# Patient Record
Sex: FEMALE | Race: BLACK OR AFRICAN AMERICAN | NOT HISPANIC OR LATINO
[De-identification: names, ages, dates, MRNs, and addresses within clinical notes are randomized per-mention and may not be internally consistent; named-entity substitution may affect disease eponyms.]

---

## 2021-10-28 ENCOUNTER — APPOINTMENT (OUTPATIENT)
Dept: GYNECOLOGIC ONCOLOGY | Facility: CLINIC | Age: 76
End: 2021-10-28
Payer: MEDICARE

## 2021-10-28 VITALS
WEIGHT: 235 LBS | HEART RATE: 124 BPM | SYSTOLIC BLOOD PRESSURE: 166 MMHG | DIASTOLIC BLOOD PRESSURE: 87 MMHG | RESPIRATION RATE: 16 BRPM | HEIGHT: 67 IN | BODY MASS INDEX: 36.88 KG/M2 | OXYGEN SATURATION: 98 %

## 2021-10-28 DIAGNOSIS — Z87.891 PERSONAL HISTORY OF NICOTINE DEPENDENCE: ICD-10-CM

## 2021-10-28 DIAGNOSIS — Z80.1 FAMILY HISTORY OF MALIGNANT NEOPLASM OF TRACHEA, BRONCHUS AND LUNG: ICD-10-CM

## 2021-10-28 DIAGNOSIS — Z86.79 PERSONAL HISTORY OF OTHER DISEASES OF THE CIRCULATORY SYSTEM: ICD-10-CM

## 2021-10-28 DIAGNOSIS — I10 ESSENTIAL (PRIMARY) HYPERTENSION: ICD-10-CM

## 2021-10-28 DIAGNOSIS — E78.5 HYPERLIPIDEMIA, UNSPECIFIED: ICD-10-CM

## 2021-10-28 PROCEDURE — 99204 OFFICE O/P NEW MOD 45 MIN: CPT | Mod: 25

## 2021-10-28 PROCEDURE — 76857 US EXAM PELVIC LIMITED: CPT | Mod: 59

## 2021-10-28 PROCEDURE — 76830 TRANSVAGINAL US NON-OB: CPT | Mod: 59

## 2021-10-28 RX ORDER — ASPIRIN 81 MG
81 TABLET, DELAYED RELEASE (ENTERIC COATED) ORAL
Refills: 0 | Status: ACTIVE | COMMUNITY

## 2021-10-28 RX ORDER — MULTIVITAMIN
TABLET ORAL
Refills: 0 | Status: ACTIVE | COMMUNITY

## 2021-10-28 RX ORDER — RIVAROXABAN 2.5 MG/1
TABLET, FILM COATED ORAL
Refills: 0 | Status: ACTIVE | COMMUNITY

## 2021-10-28 RX ORDER — ENALAPRIL MALEATE 5 MG/1
TABLET ORAL
Refills: 0 | Status: ACTIVE | COMMUNITY

## 2021-10-28 RX ORDER — CALCIUM CARBONATE 600 MG
TABLET ORAL
Refills: 0 | Status: ACTIVE | COMMUNITY

## 2021-10-28 RX ORDER — ASCORBIC ACID 500 MG
TABLET ORAL
Refills: 0 | Status: ACTIVE | COMMUNITY

## 2021-10-28 RX ORDER — LEVOTHYROXINE SODIUM 0.1 MG/1
100 TABLET ORAL
Refills: 0 | Status: ACTIVE | COMMUNITY

## 2021-11-03 NOTE — PHYSICAL EXAM
[Absent] : Uterus: Absent [Normal] : Recto-Vaginal Exam: Normal [de-identified] : Patient was interviewed and examined with chaperone present. Name of Chaperone: Sweta Hernandez PA-C

## 2021-11-03 NOTE — PROCEDURE
[FreeTextEntry1] : A US was performed in the office today. Please see report for findings. Uterus absent. RTO with complex/heterogeneous mass seen 2.5 x 2.6 x 2.5 cm.

## 2021-11-03 NOTE — END OF VISIT
[FreeTextEntry3] : This note accurately reflects the work and decisions made by me.\par Written by Sweta Hernandez PA-C acting as a scribe for Dr. Hayden Sena.

## 2021-11-03 NOTE — HISTORY OF PRESENT ILLNESS
[FreeTextEntry1] : 77 y/o  via  female, LMP at age 39 s/p vaginal hysterectomy due to heavy vaginal bleeding and fibroids being referred by Dr. Cook. Patient was recently at [facility] for SOB where she was found to have a PE and right ovarian fibroma, and treated for PNA and UTI. CTA chest with bilateral acute PE, no right heart strain. Trace right pleural effusion, Minimal reticular opacities in the Right lung base with groundglass opacities concerning for infectious/inflammatory process with atelectatic changes. Left adrenal nodularity measuring 1.7 cm indeterminant and incompletely imaged. MR Pelvis was performed which revealed right ovarian fibroma measuring 2.2 cm. Patient reports she was told likely PE came secondary to PNA, no evidence of disease on echo as per patient and LE dopplers negative. Patient reports occasional right sided pelvic pain. Denies vaginal bleeding/discharge, abdominal pain/bloating/distension, pelvis discomfort, changes in normal bowel/urinary habits, nausea/vomiting, unintentional weight loss/gain, and all other associated signs and symptoms. \par \par 2018 US revewied: S/P hysterectomy, unremarkable ovaries. \par \par Health Screening:\par Last Pap: 37 years ago \par Last Mammogram: 2020 or 2019; normal as per patient \par Last Colonoscopy: 2021, normal as per patient \par Last Dexa: ; normal as per patient

## 2021-11-03 NOTE — ASSESSMENT
[FreeTextEntry1] : 75 y/o with suspicious right ovarian fibroma. I discussed with the patient that US imaging is not concerning at this time for a urgent operation. I discussed given her recent history and treatment of pneumonia and PE, I would recommend taking care of that at this time and I do not believe her PE is related to ovarian mass. I discussed that it appears this is a benign growth of her ovary. I discussed that we would not know for certain unless we went in surgically but at this time I am recommending a PETROS for reassurance and am inclined to observe at this time and not operate. When compared to US imaging in 2018, ovarian fibroma was not noted.

## 2022-01-12 ENCOUNTER — FORM ENCOUNTER (OUTPATIENT)
Age: 77
End: 2022-01-12

## 2022-01-13 ENCOUNTER — APPOINTMENT (OUTPATIENT)
Dept: GYNECOLOGIC ONCOLOGY | Facility: CLINIC | Age: 77
End: 2022-01-13
Payer: MEDICARE

## 2022-01-13 PROCEDURE — 99213 OFFICE O/P EST LOW 20 MIN: CPT | Mod: 25

## 2022-01-13 PROCEDURE — 76830 TRANSVAGINAL US NON-OB: CPT | Mod: 59

## 2022-01-13 PROCEDURE — 76857 US EXAM PELVIC LIMITED: CPT | Mod: 59

## 2022-01-13 NOTE — PHYSICAL EXAM
[Normal] : Mood and affect: Normal [FreeTextEntry1] : Arabella Salazar Medical assistant chaperoned during results and discussion

## 2022-01-13 NOTE — ASSESSMENT
[FreeTextEntry1] : Ultrasound performed in office today revealed uterus surgically absent.\par right ovary abnormal 2.8 x 1.4 x 1.5 cm. findings: 2.6 x 2.2 x 2.4 cm grossly stable right ovarian mass.\par left ovary not seen\par free fluid in pelvis absent\par \par Discussed with patient ultrasound was stable today. Recommended patient return in July for a follow up ultrasound. Patient stated she understood and agreed to comply.

## 2022-01-13 NOTE — END OF VISIT
[FreeTextEntry3] : Written by Arabella Salazar, acting as a scribe for Dr. Hayden Sena \par This note accurately reflects the work and decisions made by me.

## 2022-01-13 NOTE — HISTORY OF PRESENT ILLNESS
[FreeTextEntry1] : 77 y/o, LMP at age 39 s/p vaginal hysterectomy due to heavy VB and fibroids was referred by Dr. Cook. Patient had presented to [facility] for SOB where she was found to have a PE and right ovarian fibroma, and treated for PNA and UTI. CTA chest with bilateral acute PE, no right heart strain. Trace right pleural effusion, Minimal reticular opacities in the Right lung base with groundglass opacities concerning for infectious/inflammatory process with atelectatic changes. Left adrenal nodularity measuring 1.7 cm indeterminant and incompletely imaged. MR Pelvis was performed which revealed right ovarian fibroma measuring 2.2 cm. Patient reported she was told likely PE came secondary to PNA, no evidence of disease on echo as per patient and LE dopplers negative. Patient reported occasional right sided pelvic pain. Denied VB/discharge, abdominal pain/bloating/distension, pelvis discomfort, changes in normal bowel/urinary habits, nausea/vomiting, unintentional weight loss/gain, and all other associated signs and symptoms. \par \par Ultrasound performed in my office in October 2021 revealed Uterus absent. RTO with complex/heterogeneous mass seen 2.5 x 2.6 x 2.5 cm. She returns to the office today for a follow up ultrasound. \par

## 2022-01-13 NOTE — REASON FOR VISIT
[FreeTextEntry1] : [place] Location \par \par [facility] [place]. [phone]\par [address] [place] [address] \par \par \par -Referred 10/2021 for ovarian fibroma. \par -S/p vaginal hysterectomy at 40 y/o. \par -PETROS 10/28/21 Nu411=1 HE4= 74 PRE=1.67(h) Post= 1.24\par -F/u ultrasound.

## 2022-07-06 ENCOUNTER — APPOINTMENT (OUTPATIENT)
Dept: GYNECOLOGIC ONCOLOGY | Facility: CLINIC | Age: 77
End: 2022-07-06

## 2022-07-06 VITALS
HEART RATE: 75 BPM | RESPIRATION RATE: 16 BRPM | HEIGHT: 67 IN | OXYGEN SATURATION: 98 % | SYSTOLIC BLOOD PRESSURE: 134 MMHG | BODY MASS INDEX: 37.04 KG/M2 | DIASTOLIC BLOOD PRESSURE: 76 MMHG | WEIGHT: 236 LBS

## 2022-07-06 PROCEDURE — 76857 US EXAM PELVIC LIMITED: CPT | Mod: 59

## 2022-07-06 PROCEDURE — 76830 TRANSVAGINAL US NON-OB: CPT | Mod: 59

## 2022-07-06 PROCEDURE — 99212 OFFICE O/P EST SF 10 MIN: CPT | Mod: 25

## 2022-07-06 RX ORDER — LEVOTHYROXINE SODIUM 0.11 MG/1
112 TABLET ORAL
Qty: 45 | Refills: 0 | Status: ACTIVE | COMMUNITY
Start: 2022-06-22

## 2022-07-06 RX ORDER — METOPROLOL SUCCINATE 50 MG/1
50 TABLET, EXTENDED RELEASE ORAL
Qty: 90 | Refills: 0 | Status: ACTIVE | COMMUNITY
Start: 2021-12-22

## 2022-07-06 RX ORDER — ENALAPRIL MALEATE 2.5 MG/1
2.5 TABLET ORAL
Qty: 90 | Refills: 0 | Status: ACTIVE | COMMUNITY
Start: 2021-12-22

## 2022-07-06 RX ORDER — ROSUVASTATIN CALCIUM 20 MG/1
20 TABLET, FILM COATED ORAL
Qty: 90 | Refills: 0 | Status: ACTIVE | COMMUNITY
Start: 2022-06-21

## 2022-07-06 NOTE — HISTORY OF PRESENT ILLNESS
[FreeTextEntry1] : Pt is a 78 yo s/p vaginal hysterectomy due to heavy bleeding at age 39 now with a right ovarian fibroma. She had Ultrasound in october 2021 showing a 2.5 x 2.6 x 2.5 cm ovarian fibroma and it was stable 1/22 on office US and presents for follow up US today. She reports no new concerns. She also had Mammogram ordered last visit.

## 2022-07-06 NOTE — PHYSICAL EXAM
[Chaperone Present] : A chaperone was present in the examining room during all aspects of the physical examination [FreeTextEntry1] : Dayana Bridges MA was present the entire duration of the patient interaction and gynecological exam. [Normal] : Masses/Tenderness: Non-tender, no masses [Capable of only limited self care, confined to bed or chair more than 50% of waking hours] : Status 3- Capable of only limited self care, confined to bed or chair more than 50% of waking hours

## 2022-07-06 NOTE — ASSESSMENT
[FreeTextEntry1] : Pt is a 76 yo with a right ovarian fibroma stable on todays US since at least 10/21. Recommend follow up every 6 months for the first 2 years and then yearly. The patient will see Dr. Castro for continued surveillance and can return with any evidence of increase or change. Mammogram reviewed which is BIRADs1 and she needs discussion regarding continued mammograms in the future.

## 2022-08-22 ENCOUNTER — FORM ENCOUNTER (OUTPATIENT)
Age: 77
End: 2022-08-22

## 2022-11-22 ENCOUNTER — OFFICE (OUTPATIENT)
Dept: URBAN - METROPOLITAN AREA CLINIC 12 | Facility: CLINIC | Age: 77
Setting detail: OPHTHALMOLOGY
End: 2022-11-22
Payer: MEDICARE

## 2022-11-22 DIAGNOSIS — H90.3: ICD-10-CM

## 2022-11-22 PROCEDURE — 92567 TYMPANOMETRY: CPT

## 2022-11-22 PROCEDURE — 92557 COMPREHENSIVE HEARING TEST: CPT

## 2022-12-08 ENCOUNTER — OFFICE (OUTPATIENT)
Dept: URBAN - METROPOLITAN AREA CLINIC 12 | Facility: CLINIC | Age: 77
Setting detail: OPHTHALMOLOGY
End: 2022-12-08

## 2022-12-08 DIAGNOSIS — H90.3: ICD-10-CM

## 2022-12-08 PROCEDURE — 92593 HEARING AID CHECK; BINAURAL: CPT

## 2023-01-09 ENCOUNTER — APPOINTMENT (OUTPATIENT)
Dept: OBGYN | Facility: CLINIC | Age: 78
End: 2023-01-09
Payer: MEDICARE

## 2023-01-09 VITALS
SYSTOLIC BLOOD PRESSURE: 134 MMHG | BODY MASS INDEX: 35.83 KG/M2 | HEIGHT: 67.5 IN | WEIGHT: 231 LBS | DIASTOLIC BLOOD PRESSURE: 76 MMHG

## 2023-01-09 DIAGNOSIS — Z12.72 ENCOUNTER FOR SCREENING FOR MALIGNANT NEOPLASM OF VAGINA: ICD-10-CM

## 2023-01-09 DIAGNOSIS — Z86.711 PERSONAL HISTORY OF PULMONARY EMBOLISM: ICD-10-CM

## 2023-01-09 DIAGNOSIS — Z85.850 PERSONAL HISTORY OF MALIGNANT NEOPLASM OF THYROID: ICD-10-CM

## 2023-01-09 DIAGNOSIS — Z00.00 ENCOUNTER FOR GENERAL ADULT MEDICAL EXAMINATION W/OUT ABNORMAL FINDINGS: ICD-10-CM

## 2023-01-09 DIAGNOSIS — Z82.49 FAMILY HISTORY OF ISCHEMIC HEART DISEASE AND OTHER DISEASES OF THE CIRCULATORY SYSTEM: ICD-10-CM

## 2023-01-09 DIAGNOSIS — Z83.3 FAMILY HISTORY OF DIABETES MELLITUS: ICD-10-CM

## 2023-01-09 LAB
BILIRUB UR QL STRIP: NORMAL
CLARITY UR: CLEAR
COLLECTION METHOD: NORMAL
GLUCOSE UR-MCNC: NORMAL
HCG UR QL: 0.2 EU/DL
HGB UR QL STRIP.AUTO: NORMAL
KETONES UR-MCNC: NORMAL
LEUKOCYTE ESTERASE UR QL STRIP: NORMAL
NITRITE UR QL STRIP: NORMAL
PH UR STRIP: 5
PROT UR STRIP-MCNC: NORMAL
SP GR UR STRIP: 1.01

## 2023-01-09 PROCEDURE — 81003 URINALYSIS AUTO W/O SCOPE: CPT | Mod: QW

## 2023-01-09 PROCEDURE — 99203 OFFICE O/P NEW LOW 30 MIN: CPT

## 2023-01-09 NOTE — DISCUSSION/SUMMARY
[FreeTextEntry1] : I reviewed the patient's medical history with her.\par Exam as above\par Stable ovarian fibroma\par Discussed follow up with US, referral given. \par Vaginal PAP done\par Follow up with results.

## 2023-01-09 NOTE — PHYSICAL EXAM
[Chaperone Present] : A chaperone was present in the examining room during all aspects of the physical examination [FreeTextEntry1] : Gina eVrma [Appropriately responsive] : appropriately responsive [Alert] : alert [No Acute Distress] : no acute distress [Soft] : soft [Non-tender] : non-tender [Non-distended] : non-distended [Oriented x3] : oriented x3 [Labia Majora] : normal [Labia Minora] : normal [Normal] : normal [Absent] : absent [Uterine Adnexae] : normal

## 2023-01-09 NOTE — REASON FOR VISIT
[Initial] : an initial consultation for [FreeTextEntry2] : ovarian mass  [FreeTextEntry1] : Dr Mckay Valdez

## 2023-01-09 NOTE — HISTORY OF PRESENT ILLNESS
[Patient reported mammogram was normal] : Patient reported mammogram was normal [Patient reported bone density results were normal] : Patient reported bone density results were normal [Patient reported colonoscopy was normal] : Patient reported colonoscopy was normal [postmenopausal] : postmenopausal [N] : Patient is not sexually active [Y] : Positive pregnancy history [FreeTextEntry1] : Melany is a 77-year-old  referred for continued surveillance of a fibroma by Dr. Valdez.\par She was initially seen in the [facility] office in 2021, after being diagnosed with a 2.2 cm ovarian fibroma on pelvic MRI.  She had a sonogram on that visit that showed a 2.6 cm heterogeneous mass.  Follow-up in January showed a stable right ovarian finding.  A repeat sono in 2022 showed a stable 2.6 cm heterogeneous right adnexal mass.  Of note, the patient had a hysterectomy at age 39.\par LMP  Age 39, hysterectomy for fibroids.\par FMP age 12 \par Last PAP age 39 – no history of abnormal Pap.\par Sexually not active\par No history of STDs.\par  -0-2-1, SAB x2,  x1\par Mammogram -  normal, 2022.\par Colonoscopy -  normal, 2021, normal\par DEXA -  normal, not within the last 2 years. \par PMH -  HTN, thyroid cancer, , hypoglycemia, PE.\par PSH - total thyroidectomy.\par Medications - enalapril, Lopressor, levothyroxine, aspirin, xarelto, caltrate\par Allergies - tylenol, cashew nuts, Vicodin, codeine, antacid. \par No smoking or drug use, ETOH - none. \par Family - DM, heart disease, arrhythmias, HTN, MI, CVA, brother - lung cancer.\par  [PGHxTotal] : 2 [[facility]] : 1 [PGHxABSpont] : 1

## 2023-01-10 LAB — HPV HIGH+LOW RISK DNA PNL CVX: NOT DETECTED

## 2023-01-17 ENCOUNTER — NON-APPOINTMENT (OUTPATIENT)
Age: 78
End: 2023-01-17

## 2023-01-17 LAB — CYTOLOGY CVX/VAG DOC THIN PREP: NORMAL

## 2023-01-24 ENCOUNTER — OFFICE (OUTPATIENT)
Dept: URBAN - METROPOLITAN AREA CLINIC 6 | Facility: CLINIC | Age: 78
Setting detail: OPHTHALMOLOGY
End: 2023-01-24
Payer: MEDICARE

## 2023-01-24 DIAGNOSIS — H43.393: ICD-10-CM

## 2023-01-24 DIAGNOSIS — H01.001: ICD-10-CM

## 2023-01-24 DIAGNOSIS — H02.831: ICD-10-CM

## 2023-01-24 DIAGNOSIS — Z96.1: ICD-10-CM

## 2023-01-24 DIAGNOSIS — H02.833: ICD-10-CM

## 2023-01-24 DIAGNOSIS — H01.002: ICD-10-CM

## 2023-01-24 DIAGNOSIS — H40.013: ICD-10-CM

## 2023-01-24 DIAGNOSIS — H01.004: ICD-10-CM

## 2023-01-24 DIAGNOSIS — H02.834: ICD-10-CM

## 2023-01-24 DIAGNOSIS — H01.005: ICD-10-CM

## 2023-01-24 DIAGNOSIS — E11.9: ICD-10-CM

## 2023-01-24 DIAGNOSIS — H11.153: ICD-10-CM

## 2023-01-24 PROCEDURE — 92014 COMPRE OPH EXAM EST PT 1/>: CPT | Performed by: OPHTHALMOLOGY

## 2023-01-24 PROCEDURE — 92250 FUNDUS PHOTOGRAPHY W/I&R: CPT | Performed by: OPHTHALMOLOGY

## 2023-01-24 ASSESSMENT — KERATOMETRY
OD_K1POWER_DIOPTERS: 44.75
OS_K2POWER_DIOPTERS: 45.00
METHOD_AUTO_MANUAL: AUTO
OS_K1POWER_DIOPTERS: 44.50
OD_AXISANGLE_DEGREES: 71
OS_AXISANGLE_DEGREES: 103
OD_K2POWER_DIOPTERS: 45.50

## 2023-01-24 ASSESSMENT — REFRACTION_AUTOREFRACTION
OS_CYLINDER: -0.25
OD_CYLINDER: -0.75
OD_SPHERE: -1.75
OS_AXIS: 95
OS_SPHERE: -2.25
OD_AXIS: 140

## 2023-01-24 ASSESSMENT — REFRACTION_MANIFEST
OS_AXIS: 020
OS_VA1: 20/30-
OS_CYLINDER: -0.75
OS_SPHERE: -2.25
OS_VA1: 20/20
OD_AXIS: 145
OD_CYLINDER: -0.25
OD_VA1: 20/25+2
OS_AXIS: 69
OD_SPHERE: -1.75
OS_CYLINDER: -0.25
OS_SPHERE: -1.00
OD_AXIS: 123
OU_VA: 20/20
OD_CYLINDER: -0.75
OD_VA1: 20/20
OD_SPHERE: -0.50

## 2023-01-24 ASSESSMENT — REFRACTION_CURRENTRX
OD_CYLINDER: -0.75
OD_VPRISM_DIRECTION: SV
OD_OVR_VA: 20/
OD_AXIS: 143
OS_VPRISM_DIRECTION: SV
OS_AXIS: 11
OS_SPHERE: -2.25
OS_OVR_VA: 20/
OD_SPHERE: -1.75
OS_CYLINDER: -0.25

## 2023-01-24 ASSESSMENT — VISUAL ACUITY
OD_BCVA: 20/20
OS_BCVA: 20/20-2

## 2023-01-24 ASSESSMENT — PACHYMETRY
OD_CT_CORRECTION: 4
OS_CT_UM: 471
OD_CT_UM: 480
OS_CT_CORRECTION: 5

## 2023-01-24 ASSESSMENT — SPHEQUIV_DERIVED
OD_SPHEQUIV: -2.125
OD_SPHEQUIV: -0.625
OS_SPHEQUIV: -1.375
OS_SPHEQUIV: -2.375
OD_SPHEQUIV: -2.125
OS_SPHEQUIV: -2.375

## 2023-01-24 ASSESSMENT — AXIALLENGTH_DERIVED
OS_AL: 24.0675
OD_AL: 23.8252
OS_AL: 23.6687
OD_AL: 23.8252
OS_AL: 24.0675
OD_AL: 23.2437

## 2023-01-24 ASSESSMENT — LID EXAM ASSESSMENTS
OD_BLEPHARITIS: RUL 1+
OS_BLEPHARITIS: LUL 1+

## 2023-01-24 ASSESSMENT — CONFRONTATIONAL VISUAL FIELD TEST (CVF)
OS_FINDINGS: FULL
OD_FINDINGS: FULL

## 2023-01-24 ASSESSMENT — TONOMETRY
OD_IOP_MMHG: 16
OS_IOP_MMHG: 14

## 2023-01-25 ENCOUNTER — NON-APPOINTMENT (OUTPATIENT)
Age: 78
End: 2023-01-25

## 2023-01-30 ENCOUNTER — NON-APPOINTMENT (OUTPATIENT)
Age: 78
End: 2023-01-30

## 2023-02-13 ENCOUNTER — OFFICE (OUTPATIENT)
Dept: URBAN - METROPOLITAN AREA CLINIC 12 | Facility: CLINIC | Age: 78
Setting detail: OPHTHALMOLOGY
End: 2023-02-13
Payer: MEDICARE

## 2023-02-13 DIAGNOSIS — H11.31: ICD-10-CM

## 2023-02-13 PROCEDURE — 92012 INTRM OPH EXAM EST PATIENT: CPT | Performed by: OPTOMETRIST

## 2023-02-13 ASSESSMENT — REFRACTION_CURRENTRX
OS_CYLINDER: -0.25
OS_SPHERE: -2.25
OD_CYLINDER: -0.75
OS_OVR_VA: 20/
OD_SPHERE: -1.75
OS_AXIS: 11
OD_OVR_VA: 20/
OD_AXIS: 143
OD_VPRISM_DIRECTION: SV
OS_VPRISM_DIRECTION: SV

## 2023-02-13 ASSESSMENT — CONFRONTATIONAL VISUAL FIELD TEST (CVF)
OD_FINDINGS: FULL
OS_FINDINGS: FULL

## 2023-02-13 ASSESSMENT — REFRACTION_AUTOREFRACTION
OS_SPHERE: -2.25
OS_CYLINDER: -0.25
OD_CYLINDER: -0.75
OS_AXIS: 172
OD_AXIS: 147
OD_SPHERE: -1.75

## 2023-02-13 ASSESSMENT — SPHEQUIV_DERIVED
OD_SPHEQUIV: -0.625
OD_SPHEQUIV: -2.125
OS_SPHEQUIV: -2.375
OS_SPHEQUIV: -2.375
OD_SPHEQUIV: -2.125
OS_SPHEQUIV: -1.375

## 2023-02-13 ASSESSMENT — REFRACTION_MANIFEST
OD_VA1: 20/20
OS_SPHERE: -1.00
OD_CYLINDER: -0.25
OS_CYLINDER: -0.75
OS_AXIS: 020
OS_AXIS: 69
OD_AXIS: 145
OS_VA1: 20/20
OS_SPHERE: -2.25
OD_SPHERE: -1.75
OD_CYLINDER: -0.75
OU_VA: 20/20
OD_SPHERE: -0.50
OS_VA1: 20/30-
OD_VA1: 20/25+2
OD_AXIS: 123
OS_CYLINDER: -0.25

## 2023-02-13 ASSESSMENT — PACHYMETRY
OS_CT_UM: 471
OD_CT_UM: 480
OS_CT_CORRECTION: 5
OD_CT_CORRECTION: 4

## 2023-02-13 ASSESSMENT — KERATOMETRY
OD_K2POWER_DIOPTERS: 45.75
OS_K1POWER_DIOPTERS: 44.75
OS_AXISANGLE_DEGREES: 104
METHOD_AUTO_MANUAL: AUTO
OD_AXISANGLE_DEGREES: 072
OD_K1POWER_DIOPTERS: 44.50
OS_K2POWER_DIOPTERS: 45.25

## 2023-02-13 ASSESSMENT — VISUAL ACUITY
OS_BCVA: 20/25-1
OD_BCVA: 20/25-1

## 2023-02-13 ASSESSMENT — LID EXAM ASSESSMENTS
OD_BLEPHARITIS: RUL 1+
OS_BLEPHARITIS: LUL 1+

## 2023-02-13 ASSESSMENT — AXIALLENGTH_DERIVED
OS_AL: 23.9723
OD_AL: 23.2437
OS_AL: 23.5767
OS_AL: 23.9723
OD_AL: 23.8252
OD_AL: 23.8252

## 2023-02-13 ASSESSMENT — TONOMETRY
OS_IOP_MMHG: 16
OD_IOP_MMHG: 17

## 2023-03-08 ENCOUNTER — OFFICE (OUTPATIENT)
Dept: URBAN - METROPOLITAN AREA CLINIC 12 | Facility: CLINIC | Age: 78
Setting detail: OPHTHALMOLOGY
End: 2023-03-08

## 2023-03-08 DIAGNOSIS — H90.3: ICD-10-CM

## 2023-03-08 PROCEDURE — 92593 HEARING AID CHECK; BINAURAL: CPT

## 2023-05-24 ENCOUNTER — TELEHEALTH (OUTPATIENT)
Dept: URBAN - METROPOLITAN AREA CLINIC 71 | Facility: CLINIC | Age: 78
Setting detail: OPHTHALMOLOGY
End: 2023-05-24
Payer: MEDICARE

## 2023-05-24 DIAGNOSIS — H11.31: ICD-10-CM

## 2023-05-24 PROCEDURE — 92012 INTRM OPH EXAM EST PATIENT: CPT | Performed by: STUDENT IN AN ORGANIZED HEALTH CARE EDUCATION/TRAINING PROGRAM

## 2023-05-24 ASSESSMENT — TONOMETRY
OD_IOP_MMHG: 16
OS_IOP_MMHG: 13

## 2023-05-24 ASSESSMENT — KERATOMETRY
OD_K1POWER_DIOPTERS: 44.50
OD_AXISANGLE_DEGREES: 067
OD_K2POWER_DIOPTERS: 45.75
METHOD_AUTO_MANUAL: AUTO
OS_K2POWER_DIOPTERS: 45.00
OS_AXISANGLE_DEGREES: 119
OS_K1POWER_DIOPTERS: 44.50

## 2023-05-24 ASSESSMENT — REFRACTION_MANIFEST
OD_SPHERE: -1.75
OD_SPHERE: -0.50
OD_CYLINDER: -0.25
OS_SPHERE: -1.00
OS_AXIS: 69
OS_AXIS: 020
OS_VA1: 20/30-
OS_VA1: 20/20
OS_CYLINDER: -0.75
OU_VA: 20/20
OD_VA1: 20/25+2
OD_AXIS: 123
OD_VA1: 20/20
OD_CYLINDER: -0.75
OS_SPHERE: -2.25
OD_AXIS: 145
OS_CYLINDER: -0.25

## 2023-05-24 ASSESSMENT — AXIALLENGTH_DERIVED
OD_AL: 23.2437
OD_AL: 23.8252
OD_AL: 23.8252
OS_AL: 23.9665
OS_AL: 24.0675
OS_AL: 23.6687

## 2023-05-24 ASSESSMENT — VISUAL ACUITY
OS_BCVA: 20/25-1
OD_BCVA: 20/20

## 2023-05-24 ASSESSMENT — REFRACTION_AUTOREFRACTION
OD_CYLINDER: -1.25
OD_SPHERE: -1.50
OS_SPHERE: -2.00
OS_AXIS: 083
OD_AXIS: 146
OS_CYLINDER: -0.25

## 2023-05-24 ASSESSMENT — SPHEQUIV_DERIVED
OS_SPHEQUIV: -2.125
OD_SPHEQUIV: -2.125
OS_SPHEQUIV: -2.375
OD_SPHEQUIV: -2.125
OS_SPHEQUIV: -1.375
OD_SPHEQUIV: -0.625

## 2023-05-24 ASSESSMENT — REFRACTION_CURRENTRX
OS_CYLINDER: -0.25
OS_AXIS: 11
OD_VPRISM_DIRECTION: SV
OD_AXIS: 143
OS_VPRISM_DIRECTION: SV
OD_CYLINDER: -0.75
OD_OVR_VA: 20/
OD_SPHERE: -1.75
OS_OVR_VA: 20/
OS_SPHERE: -2.25

## 2023-05-24 ASSESSMENT — CONFRONTATIONAL VISUAL FIELD TEST (CVF)
OS_FINDINGS: FULL
OD_FINDINGS: FULL

## 2023-05-24 ASSESSMENT — PACHYMETRY
OS_CT_UM: 471
OD_CT_CORRECTION: 4
OS_CT_CORRECTION: 5
OD_CT_UM: 480

## 2023-06-20 ENCOUNTER — OFFICE (OUTPATIENT)
Dept: URBAN - METROPOLITAN AREA CLINIC 12 | Facility: CLINIC | Age: 78
Setting detail: OPHTHALMOLOGY
End: 2023-06-20

## 2023-06-20 DIAGNOSIS — H90.3: ICD-10-CM

## 2023-06-20 PROCEDURE — 92593 HEARING AID CHECK; BINAURAL: CPT

## 2023-06-22 ENCOUNTER — NON-APPOINTMENT (OUTPATIENT)
Age: 78
End: 2023-06-22

## 2023-06-26 ENCOUNTER — NON-APPOINTMENT (OUTPATIENT)
Age: 78
End: 2023-06-26

## 2023-06-29 ENCOUNTER — APPOINTMENT (OUTPATIENT)
Dept: OBGYN | Facility: CLINIC | Age: 78
End: 2023-06-29
Payer: MEDICARE

## 2023-06-29 VITALS
BODY MASS INDEX: 38.78 KG/M2 | WEIGHT: 250 LBS | HEIGHT: 67.5 IN | SYSTOLIC BLOOD PRESSURE: 148 MMHG | DIASTOLIC BLOOD PRESSURE: 92 MMHG

## 2023-06-29 DIAGNOSIS — Z71.2 PERSON CONSULTING FOR EXPLANATION OF EXAMINATION OR TEST FINDINGS: ICD-10-CM

## 2023-06-29 DIAGNOSIS — Z12.39 ENCOUNTER FOR OTHER SCREENING FOR MALIGNANT NEOPLASM OF BREAST: ICD-10-CM

## 2023-06-29 LAB
BILIRUB UR QL STRIP: NORMAL
CLARITY UR: CLEAR
COLLECTION METHOD: NORMAL
GLUCOSE UR-MCNC: NORMAL
HCG UR QL: 0.2 EU/DL
HGB UR QL STRIP.AUTO: NORMAL
KETONES UR-MCNC: NORMAL
LEUKOCYTE ESTERASE UR QL STRIP: NORMAL
NITRITE UR QL STRIP: NORMAL
PH UR STRIP: 6.5
PROT UR STRIP-MCNC: NORMAL
SP GR UR STRIP: 1.01

## 2023-06-29 PROCEDURE — 99213 OFFICE O/P EST LOW 20 MIN: CPT

## 2023-06-29 PROCEDURE — 81003 URINALYSIS AUTO W/O SCOPE: CPT | Mod: QW

## 2023-06-29 NOTE — HISTORY OF PRESENT ILLNESS
[FreeTextEntry1] : DAMON Dhillon is coming in for follow-up on ultrasound.\par History of ovarian fibroma.\par October 2021–October 20 21-2.2 cm ovarian fibroma diagnosed a pelvic MRI\par January 2022–stable right ovarian finding.\par July 2022–2.6 stable right adnexal mass\par January 2023–2.2 cm stable right ovarian finding\par She had an ultrasound done that showed a small solid tissue mass which may be associated with the postmenopausal ovary on the right.    There is a 2 x 2.4 cm solid mass on the right adjacent to the soft tissue that is presumably the ovary that is consistent with ovarian fibroma or other solid tumor.  A similar finding is noted on previous vaginal probe scan in February 2018 with smaller estimated measurements.  On the left there is a finding that is elliptically shaped measuring 1.5 x 0.9 cm suggestive of left postmenopausal ovary.\par She has no symptoms.\par Pap was done 6 months ago and was negative.\par Mammogram was done 1 year ago.

## 2023-06-29 NOTE — DISCUSSION/SUMMARY
[FreeTextEntry1] :   I reviewed  the images of the ultrasound myself and agree with assessment of stable solid finding on the right ovary.\par I reviewed with Melany the read of the ultrasound.\par I discussed with her that at this point, due to stability of the finding I would recommend monitoring only.\par I discussed with her that if the finding changes, we will consider intervention.\par Mammogram referral given.\par Discussed pelvic ultrasound in 6 months.\par Discussed follow-up for annual exam in 6 months.\par All her questions were answered.

## 2023-08-08 ENCOUNTER — OFFICE (OUTPATIENT)
Dept: URBAN - METROPOLITAN AREA CLINIC 6 | Facility: CLINIC | Age: 78
Setting detail: OPHTHALMOLOGY
End: 2023-08-08
Payer: MEDICARE

## 2023-08-08 DIAGNOSIS — H02.831: ICD-10-CM

## 2023-08-08 DIAGNOSIS — H01.001: ICD-10-CM

## 2023-08-08 DIAGNOSIS — H01.004: ICD-10-CM

## 2023-08-08 DIAGNOSIS — H01.002: ICD-10-CM

## 2023-08-08 DIAGNOSIS — H02.834: ICD-10-CM

## 2023-08-08 DIAGNOSIS — Z96.1: ICD-10-CM

## 2023-08-08 DIAGNOSIS — H01.005: ICD-10-CM

## 2023-08-08 DIAGNOSIS — H11.153: ICD-10-CM

## 2023-08-08 DIAGNOSIS — H40.013: ICD-10-CM

## 2023-08-08 DIAGNOSIS — H02.833: ICD-10-CM

## 2023-08-08 PROCEDURE — 92083 EXTENDED VISUAL FIELD XM: CPT | Performed by: OPHTHALMOLOGY

## 2023-08-08 PROCEDURE — 92133 CPTRZD OPH DX IMG PST SGM ON: CPT | Performed by: OPHTHALMOLOGY

## 2023-08-08 PROCEDURE — 99213 OFFICE O/P EST LOW 20 MIN: CPT | Performed by: OPHTHALMOLOGY

## 2023-08-08 ASSESSMENT — KERATOMETRY
OD_K1POWER_DIOPTERS: 44.25
METHOD_AUTO_MANUAL: AUTO
OD_K2POWER_DIOPTERS: 45.50
OS_AXISANGLE_DEGREES: 105
OS_K1POWER_DIOPTERS: 44.75
OS_K2POWER_DIOPTERS: 45.25
OD_AXISANGLE_DEGREES: 75

## 2023-08-08 ASSESSMENT — REFRACTION_MANIFEST
OS_VA1: 20/30-
OS_VA1: 20/20
OD_VA1: 20/25+2
OD_CYLINDER: -0.25
OS_AXIS: 69
OS_SPHERE: -1.00
OD_VA1: 20/20
OS_AXIS: 020
OU_VA: 20/20
OD_CYLINDER: -0.75
OS_SPHERE: -2.25
OD_AXIS: 123
OD_SPHERE: -0.50
OS_CYLINDER: -0.75
OD_AXIS: 145
OD_SPHERE: -1.75
OS_CYLINDER: -0.25

## 2023-08-08 ASSESSMENT — VISUAL ACUITY
OS_BCVA: 20/20-1
OD_BCVA: 20/25-1

## 2023-08-08 ASSESSMENT — REFRACTION_CURRENTRX
OS_OVR_VA: 20/
OS_AXIS: 11
OS_CYLINDER: -0.25
OS_SPHERE: -2.25
OD_AXIS: 143
OD_VPRISM_DIRECTION: SV
OD_CYLINDER: -0.75
OD_SPHERE: -1.75
OD_OVR_VA: 20/
OS_VPRISM_DIRECTION: SV

## 2023-08-08 ASSESSMENT — CONFRONTATIONAL VISUAL FIELD TEST (CVF)
OD_FINDINGS: FULL
OS_FINDINGS: FULL

## 2023-08-08 ASSESSMENT — REFRACTION_AUTOREFRACTION
OD_SPHERE: -1.50
OS_CYLINDER: SPHERE
OD_CYLINDER: -1.00
OS_SPHERE: -2.25
OD_AXIS: 153

## 2023-08-08 ASSESSMENT — LID EXAM ASSESSMENTS
OS_BLEPHARITIS: LUL 1+
OD_BLEPHARITIS: RUL 1+

## 2023-08-08 ASSESSMENT — TONOMETRY
OS_IOP_MMHG: 12
OD_IOP_MMHG: 13

## 2023-08-08 ASSESSMENT — AXIALLENGTH_DERIVED
OD_AL: 23.9192
OS_AL: 23.9723
OD_AL: 23.3332
OD_AL: 23.8693
OS_AL: 23.5767

## 2023-08-08 ASSESSMENT — PACHYMETRY
OD_CT_UM: 480
OS_CT_UM: 471
OD_CT_CORRECTION: 4
OS_CT_CORRECTION: 5

## 2023-08-08 ASSESSMENT — SPHEQUIV_DERIVED
OS_SPHEQUIV: -2.375
OD_SPHEQUIV: -0.625
OD_SPHEQUIV: -2
OD_SPHEQUIV: -2.125
OS_SPHEQUIV: -1.375

## 2023-10-02 ENCOUNTER — OFFICE (OUTPATIENT)
Dept: URBAN - METROPOLITAN AREA CLINIC 12 | Facility: CLINIC | Age: 78
Setting detail: OPHTHALMOLOGY
End: 2023-10-02

## 2023-10-02 DIAGNOSIS — H90.3: ICD-10-CM

## 2023-10-02 PROCEDURE — 92593 HEARING AID CHECK; BINAURAL: CPT

## 2024-01-08 ENCOUNTER — APPOINTMENT (OUTPATIENT)
Dept: OBGYN | Facility: CLINIC | Age: 79
End: 2024-01-08
Payer: MEDICARE

## 2024-01-08 VITALS
DIASTOLIC BLOOD PRESSURE: 90 MMHG | SYSTOLIC BLOOD PRESSURE: 140 MMHG | BODY MASS INDEX: 37.39 KG/M2 | HEIGHT: 67.5 IN | WEIGHT: 241 LBS

## 2024-01-08 DIAGNOSIS — D27.9 BENIGN NEOPLASM OF UNSPECIFIED OVARY: ICD-10-CM

## 2024-01-08 PROCEDURE — 99213 OFFICE O/P EST LOW 20 MIN: CPT

## 2024-01-08 NOTE — PHYSICAL EXAM
[Chaperone Present] : A chaperone was present in the examining room during all aspects of the physical examination [FreeTextEntry1] :  Kelly Felder [Appropriately responsive] : appropriately responsive [Alert] : alert [No Acute Distress] : no acute distress [Soft] : soft [Non-tender] : non-tender [Non-distended] : non-distended [Oriented x3] : oriented x3 [Labia Majora] : normal [Labia Minora] : normal [Normal] : normal [Absent] : absent [Uterine Adnexae] : non-palpable [FreeTextEntry8] : No pelvic masses palpated.

## 2024-01-08 NOTE — REASON FOR VISIT
[Follow-Up] : a follow-up evaluation of [FreeTextEntry2] : Review Results of Pelvic US and Mammogram

## 2024-01-08 NOTE — DISCUSSION/SUMMARY
[FreeTextEntry1] : I reviewed with Melany the stable findings on ultrasound. I discussed with her the exam, without palpable pelvic masses. I discussed with her continuing with 6-month follow-up. I reviewed with her that I am relocating and recommended that she goes back to seeing Dr. Valdez for 6-month follow-up. All her questions were answered.

## 2024-01-08 NOTE — HISTORY OF PRESENT ILLNESS
[FreeTextEntry1] : Melany is coming in for follow-up. Known ovarian fibroma. October 2021-October 20 21-2.2 cm ovarian fibroma diagnosed a pelvic MRI  January 2022-stable right ovarian finding.  July 2022-2.6 stable right adnexal mass  January 2023-2.2 cm stable right ovarian finding  6/2023 - stable ovarian finding.   Now coming in after sonogram that showed a right ovary 3.1X3.4X2.3cm, hypoechoic lesion stable 1.8X1.7X1.6cm, left ovary 1.6X1.1X0.9cm.   She is asymptomatic.

## 2024-02-01 ENCOUNTER — OFFICE (OUTPATIENT)
Dept: URBAN - METROPOLITAN AREA CLINIC 12 | Facility: CLINIC | Age: 79
Setting detail: OPHTHALMOLOGY
End: 2024-02-01
Payer: MEDICARE

## 2024-02-01 DIAGNOSIS — H90.3: ICD-10-CM

## 2024-02-01 PROCEDURE — 92567 TYMPANOMETRY: CPT | Mod: AB

## 2024-02-01 PROCEDURE — 92557 COMPREHENSIVE HEARING TEST: CPT | Mod: AB

## 2024-02-07 ENCOUNTER — OFFICE (OUTPATIENT)
Dept: URBAN - METROPOLITAN AREA CLINIC 6 | Facility: CLINIC | Age: 79
Setting detail: OPHTHALMOLOGY
End: 2024-02-07
Payer: MEDICARE

## 2024-02-07 DIAGNOSIS — H11.153: ICD-10-CM

## 2024-02-07 DIAGNOSIS — H52.13: ICD-10-CM

## 2024-02-07 DIAGNOSIS — H02.834: ICD-10-CM

## 2024-02-07 DIAGNOSIS — H01.005: ICD-10-CM

## 2024-02-07 DIAGNOSIS — E11.9: ICD-10-CM

## 2024-02-07 DIAGNOSIS — H02.833: ICD-10-CM

## 2024-02-07 DIAGNOSIS — H01.002: ICD-10-CM

## 2024-02-07 DIAGNOSIS — H02.831: ICD-10-CM

## 2024-02-07 DIAGNOSIS — H01.001: ICD-10-CM

## 2024-02-07 DIAGNOSIS — H01.004: ICD-10-CM

## 2024-02-07 DIAGNOSIS — H40.013: ICD-10-CM

## 2024-02-07 DIAGNOSIS — H43.393: ICD-10-CM

## 2024-02-07 PROCEDURE — 92014 COMPRE OPH EXAM EST PT 1/>: CPT | Performed by: OPHTHALMOLOGY

## 2024-02-07 PROCEDURE — 92250 FUNDUS PHOTOGRAPHY W/I&R: CPT | Performed by: OPHTHALMOLOGY

## 2024-02-07 PROCEDURE — 92015 DETERMINE REFRACTIVE STATE: CPT | Performed by: OPHTHALMOLOGY

## 2024-02-07 ASSESSMENT — SPHEQUIV_DERIVED
OD_SPHEQUIV: -0.625
OD_SPHEQUIV: -1.625
OS_SPHEQUIV: -2.125
OS_SPHEQUIV: -1.375
OS_SPHEQUIV: -2.125
OD_SPHEQUIV: -1.625

## 2024-02-07 ASSESSMENT — REFRACTION_MANIFEST
OD_AXIS: 125
OS_CYLINDER: -0.75
OS_VA1: 20/20-1
OS_AXIS: 150
OS_VA1: 20/30-
OD_AXIS: 123
OS_SPHERE: -2.00
OS_CYLINDER: -0.25
OS_SPHERE: -1.00
OD_CYLINDER: -0.75
OD_SPHERE: -1.25
OD_CYLINDER: -0.25
OD_SPHERE: -0.50
OS_AXIS: 69
OD_VA1: 20/20
OD_VA1: 20/25+2
OU_VA: 20/20

## 2024-02-07 ASSESSMENT — REFRACTION_AUTOREFRACTION
OS_CYLINDER: -0.25
OS_SPHERE: -2.00
OD_AXIS: 126
OD_SPHERE: -1.00
OD_CYLINDER: -1.25
OS_AXIS: 149

## 2024-02-07 ASSESSMENT — CONFRONTATIONAL VISUAL FIELD TEST (CVF)
OS_FINDINGS: FULL
OD_FINDINGS: FULL

## 2024-02-07 ASSESSMENT — REFRACTION_CURRENTRX
OS_OVR_VA: 20/
OS_VPRISM_DIRECTION: SV
OD_OVR_VA: 20/
OS_SPHERE: -2.25
OD_CYLINDER: -0.75
OD_AXIS: 144
OS_AXIS: 17
OS_CYLINDER: -0.25
OD_VPRISM_DIRECTION: SV
OD_SPHERE: -1.75

## 2024-02-07 ASSESSMENT — LID EXAM ASSESSMENTS
OD_BLEPHARITIS: RUL 1+
OS_BLEPHARITIS: LUL 1+

## 2024-07-18 ENCOUNTER — APPOINTMENT (OUTPATIENT)
Dept: GYNECOLOGIC ONCOLOGY | Facility: CLINIC | Age: 79
End: 2024-07-18

## 2024-08-13 ENCOUNTER — OFFICE (OUTPATIENT)
Dept: URBAN - METROPOLITAN AREA CLINIC 6 | Facility: CLINIC | Age: 79
Setting detail: OPHTHALMOLOGY
End: 2024-08-13
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
## 2024-08-13 PROCEDURE — NO SHOW FE NO SHOW FEE: Performed by: OPHTHALMOLOGY

## 2024-08-14 ENCOUNTER — RX ONLY (RX ONLY)
Age: 79
End: 2024-08-14

## 2024-08-14 ENCOUNTER — DOCTOR'S OFFICE (OUTPATIENT)
Dept: URBAN - NONMETROPOLITAN AREA CLINIC 1 | Facility: CLINIC | Age: 79
Setting detail: OPHTHALMOLOGY
End: 2024-08-14
Payer: MEDICARE

## 2024-08-14 DIAGNOSIS — H26.493: ICD-10-CM

## 2024-08-14 DIAGNOSIS — H02.831: ICD-10-CM

## 2024-08-14 DIAGNOSIS — H35.373: ICD-10-CM

## 2024-08-14 DIAGNOSIS — H02.834: ICD-10-CM

## 2024-08-14 DIAGNOSIS — E11.9: ICD-10-CM

## 2024-08-14 DIAGNOSIS — H11.151: ICD-10-CM

## 2024-08-14 PROBLEM — Z96.1: Status: ACTIVE | Noted: 2024-08-14

## 2024-08-14 PROCEDURE — 92004 COMPRE OPH EXAM NEW PT 1/>: CPT | Performed by: OPHTHALMOLOGY

## 2024-08-14 PROCEDURE — 92134 CPTRZ OPH DX IMG PST SGM RTA: CPT | Performed by: OPHTHALMOLOGY

## 2024-08-14 ASSESSMENT — CONFRONTATIONAL VISUAL FIELD TEST (CVF)
OS_FINDINGS: FULL
OD_FINDINGS: FULL

## 2024-08-14 ASSESSMENT — LID POSITION - DERMATOCHALASIS: OD_DERMATOCHALASIS: 1+

## 2024-08-14 ASSESSMENT — LID POSITION - PTOSIS: OD_PTOSIS: T

## 2024-10-10 ENCOUNTER — HOSPITAL ENCOUNTER (OUTPATIENT)
Dept: HOSPITAL 99 - RAD | Age: 79
End: 2024-10-10
Payer: MEDICARE

## 2024-10-10 DIAGNOSIS — N83.201: Primary | ICD-10-CM

## 2024-11-04 ENCOUNTER — HOSPITAL ENCOUNTER (OUTPATIENT)
Dept: HOSPITAL 99 - RCS | Age: 79
End: 2024-11-04
Payer: MEDICARE

## 2024-11-04 DIAGNOSIS — E78.2: ICD-10-CM

## 2024-11-04 DIAGNOSIS — I10: Primary | ICD-10-CM

## 2025-02-26 ENCOUNTER — HOSPITAL ENCOUNTER (OUTPATIENT)
Dept: HOSPITAL 99 - RAD | Age: 80
End: 2025-02-26
Payer: MEDICARE

## 2025-02-26 DIAGNOSIS — N83.8: Primary | ICD-10-CM
